# Patient Record
Sex: FEMALE | Race: WHITE
[De-identification: names, ages, dates, MRNs, and addresses within clinical notes are randomized per-mention and may not be internally consistent; named-entity substitution may affect disease eponyms.]

---

## 2019-07-10 ENCOUNTER — HOSPITAL ENCOUNTER (OUTPATIENT)
Dept: HOSPITAL 62 - WI | Age: 19
End: 2019-07-10
Attending: INTERNAL MEDICINE
Payer: COMMERCIAL

## 2019-07-10 DIAGNOSIS — R42: Primary | ICD-10-CM

## 2019-07-10 DIAGNOSIS — I27.20: ICD-10-CM

## 2019-07-10 DIAGNOSIS — R59.0: ICD-10-CM

## 2019-07-10 PROCEDURE — 93306 TTE W/DOPPLER COMPLETE: CPT

## 2019-07-10 PROCEDURE — 76536 US EXAM OF HEAD AND NECK: CPT

## 2019-07-10 NOTE — WOMENS IMAGING REPORT
EXAM DESCRIPTION:  U/S THYROID/ST TIS HEAD   NECK



COMPLETED DATE/TIME:  7/10/2019 7:24 am



REASON FOR STUDY:  R59.9 ENLARGED LYMPH NODES, UNSPECIFIED R59.9  ENLARGED LYMPH NODES, UNSPECIFIED R
42  DIZZINESS AND GIDDINESS



COMPARISON:  None.



TECHNIQUE:  Dynamic and static gray-scale images acquired of the palpable abnormality in the right po
sterior auricular region. Selected additional color/power Doppler images recorded.  All images stored
 to PACS.



LIMITATIONS:  None.



FINDINGS:  Patient indicates a palpable nodule in the right posterior auricular soft tissues.  Ultras
ound of this area demonstrates a well-circumscribed hypoechoic solid nodule with central hilar fat li
bakari a benign lymph node.  This measures 1.3 x 1.1 x 0.4 cm in size.

Comparison imaging of the left posterior auricular region is unremarkable.  Fine



IMPRESSION:  Palpable abnormality in the right posterior auricular region correlates with a benign-ap
pearing lymph node.



TECHNICAL DOCUMENTATION:  JOB ID:  [id number]

 2011 [employer]- All Rights Reserved



Reading location - IP/workstation name: [email or web address]

## 2019-07-10 NOTE — XC[facility] REPORT
[address] [address]

                               Tel: [phone]

                               Fax: [phone]



                      Transthoracic Echocardiogram Report

_______________________________________________________________________________



Name: ABHAY WASHINGTON

MRN: [id number]                                Age: 18 yrs

Gender: Female                                 : 2000

Patient Status: Outpatient                     Patient Location: [place]

Account #: [id number]

Study Date: 07/10/2019 08:16 AM

Accession #: [id number]

_______________________________________________________________________________



Height: 63 in        Weight: 145 lb        BSA: 1.7 m2

_______________________________________________________________________________

Procedure: A two-dimensional transthoracic echocardiogram with color flow and

Doppler was performed. Study Quality: Good.

Reason For Study: DIZZINESS



History: DIZZINESS.

Ordering Physician: ROSI HDZ



Performed By: Joselin Winn

_______________________________________________________________________________



Interpretation Summary

There is no obvious cardiac source of embolus noted on this transthoracic

echocardiogram. Follow-up with a LONNIE is suggested if cardiac source is still

suspected. The left ventricle is normal in size.

There is normal left ventricular wall thickness.

LV EF is 70%

Left ventricular systolic function is low normal.

Doppler measurements suggest normal left ventricular diastolic function

The left ventricular wall motion is normal.

There is no thrombus.

No S,VSD,or PFO seen.

The right ventricle is normal in size and function.

The right atrium is normal.

The left atrial size is normal.

There is no evidence of mitral valve prolapse.

There is no vegetation seen on the mitral valve.

There is no mitral valve stenosis.

There is a trace amount of mitral regurgitation

There is no aortic valvular vegetation.

There is no aortic valve stenosis

There is no LVOT obstruction.

No aortic regurgitation is present.

There is no tricuspid stenosis.

There is a mild amount of tricuspid regurgitation

There is mild pulmonary hypertension by echo

RVSP is 33 to 38 mm of Hg , with RA mean of 5 to 10.

There is no pulmonic valvular stenosis.

There is a mild amount of pulmonic regurgitation

The aortic root is normal size.

The inferior vena cava appeared normal and decreased > 50% with respiration

(RAP 5-10 mmHg)

There is no pericardial effusion.

There is no obvious cardiac source of embolus noted on this transthoracic

echocardiogram. Follow-up with a LONNIE is suggested if cardiac source is still

suspected



MMode/2D Measurements & Calculations

RVDd: 2.6 cm        LVIDd: 4.4 cm      FS: 42.8 %         Ao root diam: 2.1 cm



IVSd: 0.76 cm       LVIDs: 2.5 cm      EDV(Teich):        Ao root area:

                    LVPWd: 0.75 cm     85.5 ml            3.5 cm2

                                       ESV(Teich):        LA dimension: 3.1 cm

                                       22.1 ml

                                       EF(Teich): 74.1 %

        _______________________________________________________________

LVLd ap4: 7.7 cm    SV(MOD-sp4):

EDV(MOD-sp4):       45.0 ml

68.0 ml

LVLs ap4: 6.5 cm

ESV(MOD-sp4):

23.0 ml

EF(MOD-sp4): 66.2 %



Doppler Measurements & Calculations

MV E max marcos:      MV P1/2t max marcos:     Ao V2 max:         LV V1 max P.4 cm/sec       125.9 cm/sec          140.4 cm/sec       5.0 mmHg



MV A max marcos:      MV P1/2t: 53.6 msec   Ao max P.9 mmHgLV V1 max:

35.0 cm/sec        MVA(P1/2t): 4.1 cm2                      111.6 cm/sec

MV E/A: 3.6        MV dec slope:

                   687.2 cm/sec2

                   MV dec time: 0.19 sec



        _______________________________________________________________

PA V2 max:         TR max marcos:           MV P1/2t-pr_phl:

105.1 cm/sec       264.7 cm/sec          53.6 msec

PA max P.4 mmHgTR max P.0 mmHg



Left Ventricle

The left ventricle is normal in size. There is normal left ventricular wall

thickness. LV EF is 70%. Left ventricular systolic function is low normal.

Doppler measurements suggest normal left ventricular diastolic function. The

left ventricular wall motion is normal. There is no thrombus. No S,VSD,or PFO

seen.



Right Ventricle

The right ventricle is normal in size and function.





Atria

The right atrium is normal. The left atrial size is normal.



Mitral Valve

There is no evidence of mitral valve prolapse. There is no vegetation seen on

the mitral valve. There is no mitral valve stenosis. There is a trace amount

of mitral regurgitation.



Aortic Valve

There is no aortic valvular vegetation. There is no aortic valve stenosis.

There is no LVOT obstruction. No aortic regurgitation is present.



Tricuspid Valve

There is no tricuspid stenosis. There is a mild amount of tricuspid

regurgitation. There is mild pulmonary hypertension by echo. RVSP is 33 to 38

mm of Hg , with RA mean of 5 to 10.





Pulmonic Valve

There is no pulmonic valvular stenosis. There is a mild amount of pulmonic

regurgitation.



Great Vessels

The aortic root is normal size. The inferior vena cava appeared normal and

decreased > 50% with respiration (RAP 5-10 mmHg).



Effusions

There is no pericardial effusion.





_______________________________________________________________________________

_______________________________________________________________________________

Electronically signed by:      Lucy Webb      on 07/10/2019 11:50 PM



CC: ROSI HDZ Lakshmi

## 2019-07-24 ENCOUNTER — HOSPITAL ENCOUNTER (OUTPATIENT)
Dept: HOSPITAL 62 - OD | Age: 19
End: 2019-07-24
Attending: PHYSICIAN ASSISTANT
Payer: COMMERCIAL

## 2019-07-24 DIAGNOSIS — Z00.00: Primary | ICD-10-CM

## 2019-07-24 DIAGNOSIS — R53.83: ICD-10-CM

## 2019-07-24 DIAGNOSIS — R00.2: ICD-10-CM

## 2019-07-24 DIAGNOSIS — R59.9: ICD-10-CM

## 2019-07-24 DIAGNOSIS — R42: ICD-10-CM

## 2019-07-24 DIAGNOSIS — R94.5: ICD-10-CM

## 2019-07-24 DIAGNOSIS — J02.9: ICD-10-CM

## 2019-07-24 LAB
ADD MANUAL DIFF: NO
ALBUMIN SERPL-MCNC: 4.9 G/DL (ref 3.7–5.6)
ALP SERPL-CCNC: 73 U/L (ref 50–135)
ALT SERPL-CCNC: 16 U/L (ref 5–35)
ANION GAP SERPL CALC-SCNC: 14 MMOL/L (ref 5–19)
AST SERPL-CCNC: 25 U/L (ref 5–30)
BASOPHILS # BLD AUTO: 0 10^3/UL (ref 0–0.2)
BASOPHILS NFR BLD AUTO: 0.3 % (ref 0–2)
BILIRUB DIRECT SERPL-MCNC: 0.4 MG/DL (ref 0–0.4)
BILIRUB SERPL-MCNC: 0.7 MG/DL (ref 0.2–1.3)
BUN SERPL-MCNC: 12 MG/DL (ref 7–20)
CALCIUM: 10.4 MG/DL (ref 8.4–10.2)
CHLORIDE SERPL-SCNC: 106 MMOL/L (ref 98–107)
CHOLEST SERPL-MCNC: 232.98 MG/DL (ref 0–200)
CO2 SERPL-SCNC: 20 MMOL/L (ref 22–30)
EOSINOPHIL # BLD AUTO: 0.1 10^3/UL (ref 0–0.6)
EOSINOPHIL NFR BLD AUTO: 2.4 % (ref 0–6)
ERYTHROCYTE [DISTWIDTH] IN BLOOD BY AUTOMATED COUNT: 12.9 % (ref 11.5–14)
FREE T4 (FREE THYROXINE): 1.31 NG/DL (ref 0.78–2.19)
GLUCOSE SERPL-MCNC: 95 MG/DL (ref 75–110)
HCT VFR BLD CALC: 43.5 % (ref 36–47)
HGB BLD-MCNC: 14.9 G/DL (ref 12–15.5)
LDLC SERPL DIRECT ASSAY-MCNC: 106 MG/DL (ref ?–100)
LYMPHOCYTES # BLD AUTO: 1.8 10^3/UL (ref 0.5–4.7)
LYMPHOCYTES NFR BLD AUTO: 30.1 % (ref 13–45)
MCH RBC QN AUTO: 29.5 PG (ref 27–33.4)
MCHC RBC AUTO-ENTMCNC: 34.2 G/DL (ref 32–36)
MCV RBC AUTO: 86 FL (ref 80–97)
MONOCYTES # BLD AUTO: 0.4 10^3/UL (ref 0.1–1.4)
MONOCYTES NFR BLD AUTO: 6.9 % (ref 3–13)
NEUTROPHILS # BLD AUTO: 3.7 10^3/UL (ref 1.7–8.2)
NEUTS SEG NFR BLD AUTO: 60.3 % (ref 42–78)
PLATELET # BLD: 244 10^3/UL (ref 150–450)
POTASSIUM SERPL-SCNC: 4.9 MMOL/L (ref 3.6–5)
PROT SERPL-MCNC: 8.3 G/DL (ref 6.3–8.2)
RBC # BLD AUTO: 5.04 10^6/UL (ref 3.72–5.28)
T3FREE SERPL-MCNC: 3.35 PG/ML (ref 2.77–5.27)
TOTAL CELLS COUNTED % (AUTO): 100 %
TRIGL SERPL-MCNC: 114 MG/DL (ref ?–150)
VLDLC SERPL CALC-MCNC: 23 MG/DL (ref 10–31)
WBC # BLD AUTO: 6.1 10^3/UL (ref 4–10.5)

## 2019-07-24 PROCEDURE — 84481 FREE ASSAY (FT-3): CPT

## 2019-07-24 PROCEDURE — 82306 VITAMIN D 25 HYDROXY: CPT

## 2019-07-24 PROCEDURE — 84439 ASSAY OF FREE THYROXINE: CPT

## 2019-07-24 PROCEDURE — 80061 LIPID PANEL: CPT

## 2019-07-24 PROCEDURE — 85025 COMPLETE CBC W/AUTO DIFF WBC: CPT

## 2019-07-24 PROCEDURE — 84443 ASSAY THYROID STIM HORMONE: CPT

## 2019-07-24 PROCEDURE — 36415 COLL VENOUS BLD VENIPUNCTURE: CPT

## 2019-07-24 PROCEDURE — 80053 COMPREHEN METABOLIC PANEL: CPT

## 2020-01-09 ENCOUNTER — HOSPITAL ENCOUNTER (OUTPATIENT)
Dept: HOSPITAL 62 - RAD | Age: 20
End: 2020-01-09
Attending: NURSE PRACTITIONER
Payer: COMMERCIAL

## 2020-01-09 DIAGNOSIS — I27.20: Primary | ICD-10-CM

## 2020-01-09 PROCEDURE — 71250 CT THORAX DX C-: CPT

## 2020-01-09 NOTE — RADIOLOGY REPORT (SQ)
EXAM DESCRIPTION:  CT CHEST WITHOUT



COMPLETED DATE/TIME:  1/9/2020 1:15 pm



REASON FOR STUDY:  (I27.20)PULMONARY HYPERTENSION, UNSPECIFIED I27.20  PULMONARY HYPERTENSION, UNSPEC
IFIED



COMPARISON:  None.



TECHNIQUE:  CT scan performed of the chest without intravenous contrast.  Images reviewed with lung, 
soft tissue and bone windows.  Reconstructed coronal and sagittal MPR images reviewed.  All images st
ored on PACS.

All CT scanners at this facility use dose modulation, iterative reconstruction, and/or weight based d
osing when appropriate to reduce radiation dose to as low as reasonably achievable (ALARA).

[facility]: Dose Right  [facility]: CareDose    [facility]: Dose Right    [facility]: Teradose 4D    [facility]: Smart PlayLab



RADIATION DOSE:  CT Rad equipment meets quality standard of care and radiation dose reduction techniq
ues were employed. CTDIvol: 4.8 mGy. DLP: 196 mGy-cm. mGy.



LIMITATIONS:  No technical limitations.



FINDINGS:  LUNGS AND PLEURA: No masses, infiltrates, or pneumothorax.  No pleural effusions or pleura
l calcifications.

HILAR AND MEDIASTINAL STRUCTURES: No identified masses or abnormal nodes.  No obvious aneurysm.

HEART AND VASCULAR STRUCTURES: No aneurysm.  No pericardial effusion.

UPPER ABDOMEN: No significant findings.  Limited exam.

THYROID AND OTHER SOFT TISSUES: No masses.  No adenopathy.

BONES: No significant finding.

HARDWARE: None in the chest.

OTHER: No other significant findings.



IMPRESSION:  NO SIGNIFICANT FINDING ON NON-CONTRASTED CHEST CT.



TECHNICAL DOCUMENTATION:  JOB ID:  [id number]

Quality ID # [id number]: Final reports with documentation of one or more dose reduction techniques (e.g., Au
tomated exposure control, adjustment of the mA and/or kV according to patient size, use of iterative 
reconstruction technique)

 2011 [employer]- All Rights Reserved



Reading location - IP/workstation name: [email or web address]

## 2020-07-23 ENCOUNTER — HOSPITAL ENCOUNTER (OUTPATIENT)
Dept: HOSPITAL 62 - OD | Age: 20
End: 2020-07-23
Attending: INTERNAL MEDICINE
Payer: COMMERCIAL

## 2020-07-23 DIAGNOSIS — I27.20: Primary | ICD-10-CM

## 2020-07-23 PROCEDURE — 86431 RHEUMATOID FACTOR QUANT: CPT

## 2020-07-23 PROCEDURE — 86021 WBC ANTIBODY IDENTIFICATION: CPT

## 2020-07-23 PROCEDURE — 86225 DNA ANTIBODY NATIVE: CPT

## 2020-07-23 PROCEDURE — 86235 NUCLEAR ANTIGEN ANTIBODY: CPT

## 2020-07-23 PROCEDURE — 36415 COLL VENOUS BLD VENIPUNCTURE: CPT

## 2020-07-24 LAB
ANTICHROMATIN AB: 0.3 AI (ref 0–0.9)
ATYPICAL PANCA: (no result) TITER
CENTROMERE B AB: <0.2 AI (ref 0–0.9)
CYTOPLASMIC (C-ANCA): (no result) TITER
DNA DOUBLE STRAND ANTIBODY ANA: <1 IU/ML (ref 0–9)
ENA JO1 AB SER-ACNC: <0.2 AI (ref 0–0.9)
SJOGREN'S ANTI-SS-B AB: <0.2 AI (ref 0–0.9)
SJOGREN'S SS-A ANTIBODY: <0.2 AI (ref 0–0.9)

## 2020-09-02 ENCOUNTER — HOSPITAL ENCOUNTER (OUTPATIENT)
Dept: HOSPITAL 62 - SP | Age: 20
End: 2020-09-02
Attending: INTERNAL MEDICINE
Payer: COMMERCIAL

## 2020-09-02 DIAGNOSIS — I27.20: Primary | ICD-10-CM
